# Patient Record
Sex: MALE | Race: OTHER | NOT HISPANIC OR LATINO | Employment: OTHER | URBAN - METROPOLITAN AREA
[De-identification: names, ages, dates, MRNs, and addresses within clinical notes are randomized per-mention and may not be internally consistent; named-entity substitution may affect disease eponyms.]

---

## 2019-12-12 ENCOUNTER — CONSULT (OUTPATIENT)
Dept: URBAN - METROPOLITAN AREA CLINIC 43 | Facility: CLINIC | Age: 74
End: 2019-12-12

## 2019-12-12 DIAGNOSIS — H25.811: ICD-10-CM

## 2019-12-12 DIAGNOSIS — H40.1134: ICD-10-CM

## 2019-12-12 DIAGNOSIS — H25.812: ICD-10-CM

## 2019-12-12 DIAGNOSIS — H04.123: ICD-10-CM

## 2019-12-12 PROCEDURE — 9222550 BILAT EXTENDED OPHTHALMOSCOPY, FIRST

## 2019-12-12 PROCEDURE — 76514 ECHO EXAM OF EYE THICKNESS: CPT

## 2019-12-12 PROCEDURE — 92250 FUNDUS PHOTOGRAPHY W/I&R: CPT

## 2019-12-12 PROCEDURE — 99204 OFFICE O/P NEW MOD 45 MIN: CPT

## 2019-12-12 PROCEDURE — 92020 GONIOSCOPY: CPT

## 2019-12-12 ASSESSMENT — PACHYMETRY
OD_CT_UM: 576
OS_CT_UM: 555

## 2019-12-12 ASSESSMENT — TONOMETRY
OD_IOP_MMHG: 26
OS_IOP_MMHG: 26

## 2019-12-12 ASSESSMENT — VISUAL ACUITY
OD_CC: 20/25+2
OS_CC: J1
OD_CC: J1
OS_CC: 20/25+1

## 2020-01-16 ENCOUNTER — IOP CHECK (OUTPATIENT)
Dept: URBAN - METROPOLITAN AREA CLINIC 43 | Facility: CLINIC | Age: 75
End: 2020-01-16

## 2020-01-16 DIAGNOSIS — H04.123: ICD-10-CM

## 2020-01-16 DIAGNOSIS — H25.812: ICD-10-CM

## 2020-01-16 DIAGNOSIS — H25.811: ICD-10-CM

## 2020-01-16 DIAGNOSIS — H40.1134: ICD-10-CM

## 2020-01-16 PROCEDURE — 92133 CPTRZD OPH DX IMG PST SGM ON: CPT

## 2020-01-16 PROCEDURE — 92012 INTRM OPH EXAM EST PATIENT: CPT

## 2020-01-16 RX ORDER — TIMOLOL MALEATE 6.8 MG/ML: 1 SOLUTION OPHTHALMIC ONCE A DAY

## 2020-01-16 ASSESSMENT — TONOMETRY
OD_IOP_MMHG: 20
OS_IOP_MMHG: 20

## 2020-01-16 ASSESSMENT — VISUAL ACUITY
OS_CC: 20/25-1
OD_CC: 20/25-1

## 2020-02-18 NOTE — PATIENT DISCUSSION
REFRACTIVE ERROR, OU - DISCUSSED OPTION OF CORRECTING AT THE TIME OF CATARACT SURGERY. PT UNDERSTANDS AND ELECTS DISTANCE AND NEAR OPTION.

## 2020-02-18 NOTE — PATIENT DISCUSSION
CATARACT, OU - EQUALLY VISUALLY SIGNIFICANT . SCHEDULE SX OS (DOMINATE EYE) THEN LATER IN OS IF VISUAL SYMPTOMS PERSIST.  GLS RX GIVEN TO FILL IF DESIRES IN THE EVENT PT DOES NOT PROCEED W/ SX.

## 2020-02-18 NOTE — PATIENT DISCUSSION
GLAUCOMA SUSPECT, OU BASED ON OPTIC NERVE CUPPING. IOP 10/11, OU. OCT NEXT VISIT TO CHECK FOR RNFL THINNING. VISUAL FIELD NEXT VISIT TO CHECK FOR VISUAL FIELD LOSS.

## 2020-02-18 NOTE — PATIENT DISCUSSION
***The patient is interested in refractive cataract surgery. After discussing all options for becoming less dependent on glasses after surgery, the patient has elected near, intermediate, and distance vision with multifocal IOLs . The anticipated visual outcome is satisfactory distance, intermediate, and near (patient understands she may need glasses for some near like fine print or in dim lighting). ***

## 2020-02-26 NOTE — PATIENT DISCUSSION
Continue: prednisol ace-gatiflox-bromfen (prednisol ace-gatiflox-bromfen): drops,suspension: 1-0.5-0.075% 1 drop three times a day as directed into affected eye 02-

## 2020-03-05 NOTE — PATIENT DISCUSSION
Surgery  Counseling: I have discussed the option of glasses versus cataract surgery versus following . It was explained that when vision no longer meets the patient's visual needs and a new prescription for glasses is not likely to improve all of the patient's visual symptoms, the option of cataract surgery is a reasonable next step. It was explained that there is no guarantee that removing the cataract will improve their visual symptoms, however; it is believed that the cataract is contributing to the patient's visual impairment and surgery may significantly improve both the visual and functional status of the patient. The risks, benefits and alternatives of surgery were discussed with the patient. After this discussion, the patient desires to proceed with cataract surgery with implantation of an intraocular lens to improve vision to reduce glare at night and sunlight during the day.

## 2020-03-05 NOTE — PATIENT DISCUSSION
***This patient had refractive cataract surgery performed. A multifocal IOL was placed to achieve a target refraction of plano (which should provide them with satisfactory distance and near vision). ***

## 2020-03-12 NOTE — PATIENT DISCUSSION
***This patient had refractive cataract surgery performed. A multifocal toric IOL was placed to achieve a target refraction of plano (which should provide them with satisfactory distance and near vision). ***

## 2020-05-08 NOTE — PATIENT DISCUSSION
S/P PC IOL, OU -  DOING WELL. HAPPY W/ CLARITY AT ALL RANGES. CONTINUE DROPS AS DIRECTED. RX ATS QID/PRN, EDU ON PROPER EYELID HYGIENE/MAKEUP APPLICATION. FOLLOW.

## 2020-05-19 NOTE — PATIENT DISCUSSION
[facility] Visual Field 36 point screen: I have reviewed the visual fields both taped and untaped on this patient which demonstrate significant obstruction of the patient's peripheral visual field on the left eye.

## 2020-05-19 NOTE — PATIENT DISCUSSION
PHOTOGRAPHS: I have reviewed the external ocular photographs of this patient which show the following: significant ptosis of the left upper eyelid and mild dermatochalasis of both upper and lower eyelids.

## 2020-05-19 NOTE — PATIENT DISCUSSION
BOTOX for COSMETIC (17.5 units): The patient presented with multiple facial rhytids after informed consent the patient was treated with Botox at a dosage documented on [employer] in this cart. The patient tolerated the procedure well.

## 2020-05-29 NOTE — PATIENT DISCUSSION
One vial of Juvederm Ultra Plus was given to the nasolabial folds and marionette lines after appropriate consent with a careful review of the risks and benefits. The area was prepped with an isopropyl alcohol pad. The patient was given ice packs to minimize discomfort before, during, and after the procedure. The patient tolerated the procedure well and was given post procedural care instructions.

## 2020-08-25 NOTE — PATIENT DISCUSSION
2 vials of Juvederm Ultra Plus XC were given to the nasolabial folds, marionette lines, and the chin after appropriate consent with a careful review of the risks and benefits. The area was prepped with an isopropyl alcohol pad. The patient was given ice packs to minimize discomfort before, during, and after the procedure. The patient tolerated the procedure well and was given post procedural care instructions.

## 2020-11-10 NOTE — PATIENT DISCUSSION
BOTOX for COSMETIC (50 units): The patient presented with multiple facial rhytids after informed consent the patient was treated with Botox at a dosage documented on [employer] in this cart. The patient tolerated the procedure well.

## 2020-11-13 ENCOUNTER — IOP CHECK (OUTPATIENT)
Dept: URBAN - METROPOLITAN AREA CLINIC 36 | Facility: CLINIC | Age: 75
End: 2020-11-13

## 2020-11-13 DIAGNOSIS — H40.1121: ICD-10-CM

## 2020-11-13 DIAGNOSIS — H40.1112: ICD-10-CM

## 2020-11-13 PROCEDURE — 92012 INTRM OPH EXAM EST PATIENT: CPT

## 2020-11-13 PROCEDURE — 92083 EXTENDED VISUAL FIELD XM: CPT

## 2020-11-13 ASSESSMENT — TONOMETRY
OS_IOP_MMHG: 24
OD_IOP_MMHG: 30

## 2020-11-13 ASSESSMENT — VISUAL ACUITY
OS_CC: 20/20
OD_CC: 20/20

## 2020-11-30 ENCOUNTER — IOP CHECK (OUTPATIENT)
Dept: URBAN - METROPOLITAN AREA CLINIC 36 | Facility: CLINIC | Age: 75
End: 2020-11-30

## 2020-11-30 DIAGNOSIS — H40.1112: ICD-10-CM

## 2020-11-30 DIAGNOSIS — H40.1121: ICD-10-CM

## 2020-11-30 PROCEDURE — 92012 INTRM OPH EXAM EST PATIENT: CPT

## 2020-11-30 ASSESSMENT — TONOMETRY
OS_IOP_MMHG: 19
OD_IOP_MMHG: 18

## 2020-11-30 ASSESSMENT — VISUAL ACUITY
OS_CC: 20/20
OD_CC: 20/25-2

## 2021-02-23 ENCOUNTER — IOP CHECK (OUTPATIENT)
Dept: URBAN - METROPOLITAN AREA CLINIC 36 | Facility: CLINIC | Age: 76
End: 2021-02-23

## 2021-02-23 DIAGNOSIS — H40.1121: ICD-10-CM

## 2021-02-23 DIAGNOSIS — H40.1112: ICD-10-CM

## 2021-02-23 PROCEDURE — 92012 INTRM OPH EXAM EST PATIENT: CPT

## 2021-02-23 ASSESSMENT — VISUAL ACUITY
OS_SC: 20/200-1
OS_CC: J1
OS_CC: 20/20-1
OD_SC: 20/200-1
OD_CC: 20/25+2
OD_SC: >J10
OS_SC: >J10
OD_CC: J1

## 2021-02-23 ASSESSMENT — TONOMETRY
OS_IOP_MMHG: 18
OD_IOP_MMHG: 18

## 2021-03-19 NOTE — PATIENT DISCUSSION
Discussed the r/b of a chemical peel in detail with the patient today. Patient understands and wishes to proceed. The chemical peel was performed without difficulty. Follow up prn.

## 2021-03-26 NOTE — PATIENT DISCUSSION
One vial of Voluma was given to the cheeks after appropriate consent with a careful review of the risks and benefits. The area was prepped with an isopropyl alcohol pad. The patient was given ice packs to minimize discomfort before, during, and after the procedure. The patient tolerated the procedure well and was given post procedural care instructions.

## 2021-07-13 NOTE — PATIENT DISCUSSION
One vial of Voluma was given to the chin and jaw line after appropriate consent with a careful review of the risks and benefits. The area was prepped with an isopropyl alcohol pad. The patient was given ice packs to minimize discomfort before, during, and after the procedure. The patient tolerated the procedure well and was given post procedural care instructions.

## 2021-08-24 NOTE — PATIENT DISCUSSION
GLAUCOMA SUSPECT, LOW RISK OU - LARGE C/D OU, NORMAL IOP TODAY. RNFL TODAY WNL OU. MONITOR X 1 YEAR WITH OCT.

## 2021-08-24 NOTE — PATIENT DISCUSSION
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 PRESCRIBED PRES-FREE ARTIFICIAL TEARS  TID - QID, GEL TEARS QHS OU WITH RECOMMENDED SLEEP MASK DUE TO CELING FAN USE AT NIGHT. MONITOR X 1 YEAR OR SOONER IF SYMPTOMS DO NOT IMPROVE.

## 2021-12-02 ENCOUNTER — DILATED FUNDUS EXAM (OUTPATIENT)
Dept: URBAN - METROPOLITAN AREA CLINIC 36 | Facility: CLINIC | Age: 76
End: 2021-12-02

## 2021-12-02 DIAGNOSIS — H04.123: ICD-10-CM

## 2021-12-02 DIAGNOSIS — H40.1121: ICD-10-CM

## 2021-12-02 DIAGNOSIS — H40.1112: ICD-10-CM

## 2021-12-02 DIAGNOSIS — H25.812: ICD-10-CM

## 2021-12-02 DIAGNOSIS — H25.811: ICD-10-CM

## 2021-12-02 PROCEDURE — 92133 CPTRZD OPH DX IMG PST SGM ON: CPT

## 2021-12-02 PROCEDURE — 92014 COMPRE OPH EXAM EST PT 1/>: CPT

## 2021-12-02 ASSESSMENT — VISUAL ACUITY
OD_SC: >J10
OS_CC: J2
OS_CC: 20/25-1
OS_SC: 20/200
OD_SC: 20/200
OD_CC: J2
OS_SC: >J10
OD_CC: 20/25-2

## 2021-12-02 ASSESSMENT — TONOMETRY
OD_IOP_MMHG: 20
OS_IOP_MMHG: 18

## 2021-12-14 NOTE — PATIENT DISCUSSION
Discussed r/b of a chemical peel today.  Patient expressed understanding and wishes to proceed. Peel was done without difficulty and the patient tolerated the procedure well.

## 2021-12-21 NOTE — PATIENT DISCUSSION
Recommend 50U of Cosmetic Botox. Discussed the risks and benefits of procedure, patient expressed understanding and wishes to proceed.

## 2021-12-21 NOTE — PROCEDURE NOTE: CLINICAL
PROCEDURE NOTE: Botox, Cosmetic #50 OU. Diagnosis: Rhytids, Full Face. BOTOX for COSMETIC (50 units): The patient presented with multiple facial rhytids after informed consent the patient was treated with Botox. The patient tolerated the procedure well.

## 2022-03-31 NOTE — PROCEDURE NOTE: CLINICAL
PROCEDURE NOTE: Botox, Cosmetic #50 OU. Diagnosis: Rhytids, Full Face. BOTOX for COSMETIC (50 units): The patient presented with multiple facial rhytids after informed consent the patient was treated with Botox. The patient tolerated the procedure well. Greta Escobar PROCEDURE NOTE: Juvederm Voluma #1 OU. Diagnosis: Rhytids, Full Face. One syringe of Voluma was given to the cheeks after appropriate consent with a careful review of the risks and benefits. The area was prepped with a chlorhexidine swab. The patient tolerated the procedure well and was given post procedural care instructions. Greta Escobar PROCEDURE NOTE: Juvederm Ultra Plus #1 OU. Diagnosis: Rhytids, Full Face. One syringe of Juvederm Ultra Plus was given to the marionette lines after appropriate consent with a careful review of the risks and benefits. The area was prepped with an isopropyl alcohol pad. The patient was given ice packs to minimize discomfort before, during, and after the procedure. The patient tolerated the procedure well and was given post procedural care instructions. Greta Escobar

## 2022-04-01 ENCOUNTER — ESTABLISHED PATIENT (OUTPATIENT)
Dept: URBAN - METROPOLITAN AREA CLINIC 36 | Facility: CLINIC | Age: 77
End: 2022-04-01

## 2022-04-01 DIAGNOSIS — H40.1121: ICD-10-CM

## 2022-04-01 DIAGNOSIS — H40.1112: ICD-10-CM

## 2022-04-01 PROCEDURE — 92012 INTRM OPH EXAM EST PATIENT: CPT

## 2022-04-01 PROCEDURE — 92083 EXTENDED VISUAL FIELD XM: CPT

## 2022-04-01 ASSESSMENT — TONOMETRY
OS_IOP_MMHG: 19
OD_IOP_MMHG: 19

## 2022-04-01 ASSESSMENT — VISUAL ACUITY
OS_CC: 20/25-1
OD_CC: J4
OD_CC: 20/25
OS_CC: J4

## 2024-04-01 ENCOUNTER — COMPREHENSIVE EXAM (OUTPATIENT)
Dept: URBAN - METROPOLITAN AREA CLINIC 36 | Facility: CLINIC | Age: 79
End: 2024-04-01

## 2024-04-01 DIAGNOSIS — H04.123: ICD-10-CM

## 2024-04-01 DIAGNOSIS — H25.813: ICD-10-CM

## 2024-04-01 DIAGNOSIS — H52.7: ICD-10-CM

## 2024-04-01 DIAGNOSIS — H40.1112: ICD-10-CM

## 2024-04-01 DIAGNOSIS — H40.1121: ICD-10-CM

## 2024-04-01 PROCEDURE — 92133 CPTRZD OPH DX IMG PST SGM ON: CPT

## 2024-04-01 PROCEDURE — 92015 DETERMINE REFRACTIVE STATE: CPT

## 2024-04-01 PROCEDURE — 99214 OFFICE O/P EST MOD 30 MIN: CPT

## 2024-04-01 ASSESSMENT — TONOMETRY
OS_IOP_MMHG: 18
OD_IOP_MMHG: 17

## 2024-04-01 ASSESSMENT — VISUAL ACUITY
OS_CC: 20/25-1
OS_CC: J2
OD_CC: J1+
OD_CC: 20/25-1
OS_SC: 20/200
OD_SC: 20/200

## 2024-11-18 ENCOUNTER — FOLLOW UP (OUTPATIENT)
Dept: URBAN - METROPOLITAN AREA CLINIC 36 | Facility: CLINIC | Age: 79
End: 2024-11-18

## 2024-11-18 DIAGNOSIS — H40.1121: ICD-10-CM

## 2024-11-18 DIAGNOSIS — H40.1112: ICD-10-CM

## 2024-11-18 PROCEDURE — 99213 OFFICE O/P EST LOW 20 MIN: CPT

## 2024-11-18 PROCEDURE — 92083 EXTENDED VISUAL FIELD XM: CPT

## 2025-03-26 ENCOUNTER — COMPREHENSIVE EXAM (OUTPATIENT)
Age: 80
End: 2025-03-26

## 2025-03-26 DIAGNOSIS — H25.813: ICD-10-CM

## 2025-03-26 DIAGNOSIS — H40.1112: ICD-10-CM

## 2025-03-26 DIAGNOSIS — H40.1121: ICD-10-CM

## 2025-03-26 DIAGNOSIS — H52.7: ICD-10-CM

## 2025-03-26 DIAGNOSIS — H18.593: ICD-10-CM

## 2025-03-26 DIAGNOSIS — H04.123: ICD-10-CM

## 2025-03-26 PROCEDURE — 92250 FUNDUS PHOTOGRAPHY W/I&R: CPT

## 2025-03-26 PROCEDURE — 92014 COMPRE OPH EXAM EST PT 1/>: CPT

## 2025-03-26 PROCEDURE — 92015 DETERMINE REFRACTIVE STATE: CPT
